# Patient Record
Sex: FEMALE | Race: WHITE | NOT HISPANIC OR LATINO | URBAN - METROPOLITAN AREA
[De-identification: names, ages, dates, MRNs, and addresses within clinical notes are randomized per-mention and may not be internally consistent; named-entity substitution may affect disease eponyms.]

---

## 2018-04-19 ENCOUNTER — EMERGENCY (EMERGENCY)
Facility: HOSPITAL | Age: 23
LOS: 0 days | Discharge: HOME | End: 2018-04-19
Attending: EMERGENCY MEDICINE | Admitting: EMERGENCY MEDICINE
Payer: COMMERCIAL

## 2018-04-19 VITALS
OXYGEN SATURATION: 100 % | HEART RATE: 77 BPM | SYSTOLIC BLOOD PRESSURE: 97 MMHG | TEMPERATURE: 98 F | DIASTOLIC BLOOD PRESSURE: 54 MMHG | RESPIRATION RATE: 16 BRPM

## 2018-04-19 VITALS — WEIGHT: 115.08 LBS | HEIGHT: 64 IN

## 2018-04-19 DIAGNOSIS — R07.9 CHEST PAIN, UNSPECIFIED: ICD-10-CM

## 2018-04-19 DIAGNOSIS — R06.02 SHORTNESS OF BREATH: ICD-10-CM

## 2018-04-19 DIAGNOSIS — K83.0 CHOLANGITIS: ICD-10-CM

## 2018-04-19 LAB
ALBUMIN SERPL ELPH-MCNC: 3.7 G/DL — SIGNIFICANT CHANGE UP (ref 3.3–5)
ALP SERPL-CCNC: 584 U/L — HIGH (ref 40–120)
ALT FLD-CCNC: 194 U/L — HIGH (ref 12–78)
ANION GAP SERPL CALC-SCNC: 7 MMOL/L — SIGNIFICANT CHANGE UP (ref 5–17)
APTT BLD: 30.7 SEC — SIGNIFICANT CHANGE UP (ref 27.5–37.4)
AST SERPL-CCNC: 164 U/L — HIGH (ref 15–37)
BASOPHILS # BLD AUTO: 0.04 K/UL — SIGNIFICANT CHANGE UP (ref 0–0.2)
BASOPHILS NFR BLD AUTO: 0.7 % — SIGNIFICANT CHANGE UP (ref 0–2)
BILIRUB SERPL-MCNC: 0.7 MG/DL — SIGNIFICANT CHANGE UP (ref 0.2–1.2)
BUN SERPL-MCNC: 9 MG/DL — SIGNIFICANT CHANGE UP (ref 7–23)
CALCIUM SERPL-MCNC: 9.2 MG/DL — SIGNIFICANT CHANGE UP (ref 8.5–10.1)
CHLORIDE SERPL-SCNC: 106 MMOL/L — SIGNIFICANT CHANGE UP (ref 96–108)
CK SERPL-CCNC: 99 U/L — SIGNIFICANT CHANGE UP (ref 26–192)
CO2 SERPL-SCNC: 27 MMOL/L — SIGNIFICANT CHANGE UP (ref 22–31)
CREAT SERPL-MCNC: 0.67 MG/DL — SIGNIFICANT CHANGE UP (ref 0.5–1.3)
D DIMER BLD IA.RAPID-MCNC: <150 NG/ML DDU — SIGNIFICANT CHANGE UP
EOSINOPHIL # BLD AUTO: 0.37 K/UL — SIGNIFICANT CHANGE UP (ref 0–0.5)
EOSINOPHIL NFR BLD AUTO: 6.3 % — HIGH (ref 0–6)
GLUCOSE SERPL-MCNC: 91 MG/DL — SIGNIFICANT CHANGE UP (ref 70–99)
HCG SERPL-ACNC: <1 MIU/ML — SIGNIFICANT CHANGE UP
HCT VFR BLD CALC: 36.4 % — SIGNIFICANT CHANGE UP (ref 34.5–45)
HGB BLD-MCNC: 12.9 G/DL — SIGNIFICANT CHANGE UP (ref 11.5–15.5)
IMM GRANULOCYTES NFR BLD AUTO: 0 % — SIGNIFICANT CHANGE UP (ref 0–1.5)
INR BLD: 1.05 RATIO — SIGNIFICANT CHANGE UP (ref 0.88–1.16)
LYMPHOCYTES # BLD AUTO: 2.21 K/UL — SIGNIFICANT CHANGE UP (ref 1–3.3)
LYMPHOCYTES # BLD AUTO: 37.5 % — SIGNIFICANT CHANGE UP (ref 13–44)
MAGNESIUM SERPL-MCNC: 2.1 MG/DL — SIGNIFICANT CHANGE UP (ref 1.6–2.6)
MCHC RBC-ENTMCNC: 31.7 PG — SIGNIFICANT CHANGE UP (ref 27–34)
MCHC RBC-ENTMCNC: 35.4 GM/DL — SIGNIFICANT CHANGE UP (ref 32–36)
MCV RBC AUTO: 89.4 FL — SIGNIFICANT CHANGE UP (ref 80–100)
MONOCYTES # BLD AUTO: 0.61 K/UL — SIGNIFICANT CHANGE UP (ref 0–0.9)
MONOCYTES NFR BLD AUTO: 10.4 % — SIGNIFICANT CHANGE UP (ref 2–14)
NEUTROPHILS # BLD AUTO: 2.66 K/UL — SIGNIFICANT CHANGE UP (ref 1.8–7.4)
NEUTROPHILS NFR BLD AUTO: 45.1 % — SIGNIFICANT CHANGE UP (ref 43–77)
NRBC # BLD: 0 /100 WBCS — SIGNIFICANT CHANGE UP (ref 0–0)
PLATELET # BLD AUTO: 276 K/UL — SIGNIFICANT CHANGE UP (ref 150–400)
POTASSIUM SERPL-MCNC: 3.7 MMOL/L — SIGNIFICANT CHANGE UP (ref 3.5–5.3)
POTASSIUM SERPL-SCNC: 3.7 MMOL/L — SIGNIFICANT CHANGE UP (ref 3.5–5.3)
PROT SERPL-MCNC: 8 GM/DL — SIGNIFICANT CHANGE UP (ref 6–8.3)
PROTHROM AB SERPL-ACNC: 11.3 SEC — SIGNIFICANT CHANGE UP (ref 9.8–12.7)
RBC # BLD: 4.07 M/UL — SIGNIFICANT CHANGE UP (ref 3.8–5.2)
RBC # FLD: 11.7 % — SIGNIFICANT CHANGE UP (ref 10.3–14.5)
SODIUM SERPL-SCNC: 140 MMOL/L — SIGNIFICANT CHANGE UP (ref 135–145)
TROPONIN I SERPL-MCNC: <0.015 NG/ML — SIGNIFICANT CHANGE UP (ref 0.01–0.04)
WBC # BLD: 5.89 K/UL — SIGNIFICANT CHANGE UP (ref 3.8–10.5)
WBC # FLD AUTO: 5.89 K/UL — SIGNIFICANT CHANGE UP (ref 3.8–10.5)

## 2018-04-19 PROCEDURE — 99284 EMERGENCY DEPT VISIT MOD MDM: CPT

## 2018-04-19 PROCEDURE — 93010 ELECTROCARDIOGRAM REPORT: CPT

## 2018-04-19 RX ORDER — SODIUM CHLORIDE 9 MG/ML
1000 INJECTION INTRAMUSCULAR; INTRAVENOUS; SUBCUTANEOUS ONCE
Qty: 0 | Refills: 0 | Status: COMPLETED | OUTPATIENT
Start: 2018-04-19 | End: 2018-04-19

## 2018-04-19 RX ORDER — SODIUM CHLORIDE 9 MG/ML
3 INJECTION INTRAMUSCULAR; INTRAVENOUS; SUBCUTANEOUS ONCE
Qty: 0 | Refills: 0 | Status: COMPLETED | OUTPATIENT
Start: 2018-04-19 | End: 2018-04-19

## 2018-04-19 RX ADMIN — SODIUM CHLORIDE 1000 MILLILITER(S): 9 INJECTION INTRAMUSCULAR; INTRAVENOUS; SUBCUTANEOUS at 21:32

## 2018-04-19 RX ADMIN — SODIUM CHLORIDE 3 MILLILITER(S): 9 INJECTION INTRAMUSCULAR; INTRAVENOUS; SUBCUTANEOUS at 21:32

## 2018-04-19 NOTE — ED STATDOCS - ATTENDING CONTRIBUTION TO CARE
Attending Contribution to Care: I, Emily Rodríguez, performed the initial face to face bedside interview with this patient regarding history of present illness, review of symptoms and relevant past medical, social and family history.  I completed an independent physical examination.  I was the initial provider who evaluated this patient. I have signed out the follow up of any pending tests (i.e. labs, radiological studies) to the ACP.  I have communicated the patient’s plan of care and disposition with the ACP.

## 2018-04-19 NOTE — ED STATDOCS - PROGRESS NOTE DETAILS
DAPHNEY Duran:  Pt seen and examined by the attending, she went to urgent care for eval of intermittent CP and SOB and was referred to ED. CXR done at urgent care and had prelim reading of [facility]. Pt refusing repeating CXR. She brought a CD but unable to open the doc. Pt denies any other complaint. on reviewing the blood work pt f/w elevated LFTs, d/w pt states she does have a hx of primary sclerosing cholangitis and f/u with GI and the numbers in the ED are better than the levels she had outpt. will dc home for outpt f/u. d/w attending.  An opportunity to ask questions was given.  Discussed the importance of prompt, close medical follow-up.  Patient will return with any changes, concerns or persistent / worsening symptoms.  Understanding of all instructions verbalized.

## 2018-04-19 NOTE — ED ADULT NURSE NOTE - OBJECTIVE STATEMENT
Substernal chest pain that started yesterday while at rest patient denies heavy lifting or known injury.  Patient states pain makes her feel short of breath. Hx of flight from [place] on Monday.

## 2018-04-19 NOTE — ED STATDOCS - OBJECTIVE STATEMENT
21 y/o f presenting to the ED sent by [facility] for intermittent CP, SOB since yesterday. Pt states sx began while sitting on the couch. States CP is heavy, usually occurs while sitting at rest. Pt on no-meat diet. Pt received CXR, EKG at [facility] with no remarkable findings. Not on birth control, doesn't think she is pregnant. Denies cough. Denies PMHx. Recent travel to [place]. 21 y/o f presenting to the ED sent by Urgent Care for intermittent CP, SOB since yesterday. Pt states sx began while sitting on the couch. States CP is heavy, usually occurs while sitting at rest. Pt on no-meat diet. Pt received CXR, EKG at [facility] with no remarkable findings. Not on birth control, doesn't think she is pregnant. Denies cough. Denies PMHx. Recent travel to [place].

## 2018-04-19 NOTE — ED STATDOCS - MEDICAL DECISION MAKING DETAILS
EKG, review CXR from [facility], 1 set of screening cardiac labs. If tests are normal, will refer pt top PMD and start pt on lung incentive spirometer.

## 2018-04-19 NOTE — ED STATDOCS - MUSCULOSKELETAL, MLM
range of motion is not limited and there is no muscle tenderness. No reproducible chest wall TTP or back TTP. No calf TTP.

## 2018-09-28 ENCOUNTER — EMERGENCY (EMERGENCY)
Facility: HOSPITAL | Age: 23
LOS: 0 days | Discharge: HOME | End: 2018-09-28
Attending: EMERGENCY MEDICINE | Admitting: EMERGENCY MEDICINE
Payer: COMMERCIAL

## 2018-09-28 VITALS
OXYGEN SATURATION: 99 % | DIASTOLIC BLOOD PRESSURE: 76 MMHG | TEMPERATURE: 99 F | SYSTOLIC BLOOD PRESSURE: 92 MMHG | RESPIRATION RATE: 18 BRPM | HEART RATE: 75 BPM

## 2018-09-28 VITALS — HEIGHT: 64 IN | WEIGHT: 115.08 LBS

## 2018-09-28 DIAGNOSIS — M25.561 PAIN IN RIGHT KNEE: ICD-10-CM

## 2018-09-28 DIAGNOSIS — Z87.19 PERSONAL HISTORY OF OTHER DISEASES OF THE DIGESTIVE SYSTEM: ICD-10-CM

## 2018-09-28 DIAGNOSIS — Z87.828 PERSONAL HISTORY OF OTHER (HEALED) PHYSICAL INJURY AND TRAUMA: ICD-10-CM

## 2018-09-28 DIAGNOSIS — Z98.890 OTHER SPECIFIED POSTPROCEDURAL STATES: ICD-10-CM

## 2018-09-28 PROCEDURE — 93971 EXTREMITY STUDY: CPT | Mod: 26,RT

## 2018-09-28 PROCEDURE — 99284 EMERGENCY DEPT VISIT MOD MDM: CPT

## 2018-09-28 NOTE — ED STATDOCS - MEDICAL DECISION MAKING DETAILS
21 y/o F with right knee pain. Plan: US, reassess. 21 y/o F with right knee pain. Neg ant drawer test, FROm, motor/sensation intact. joint is not lax. Plan: US, reassess.

## 2018-09-28 NOTE — ED STATDOCS - PROGRESS NOTE DETAILS
signed Ashley Gabriel PA-C Pt seen in intake initially by Dr Adan. signed Ashley Gabriel PA-C Pt seen in intake initially by Dr Adan.  22F c/o pain in her right knee, felt a 'pop' last night. able to ambulate with some pain, extensor mechanism intact. No significant findings on sono. Pt declines xray, will f/u with her ortho who did her ACL repair years ago. return precautions given. Pt feeling well, pt and family agree with DC and plan of care. signed Ashley Gabriel PA-C Pt seen in intake initially by Dr Adan.  22F c/o pain in her right knee, felt a 'pop' last night. able to ambulate with some pain, extensor mechanism intact. No significant findings on sono. Pt declines xray, will f/u with her ortho who did her ACL repair years ago. return precautions given. Will apply ace wrap, immobilizer. Pt notes it was hard for her to drive due to the pain. Advised pt not to drive if she is unable to easily operate pedals normally.  Pt feeling well, pt and family agree with DC and plan of care.

## 2018-09-28 NOTE — ED STATDOCS - OBJECTIVE STATEMENT
21 y/o F with PMHx of PSC presenting to the ED c/o right knee pain since last night. Reports that she was sitting down, moved her right leg, and felt a "pop" in her right knee. Pt had right ACL surgery 11 years ago and came to ED because she is concerned about an ACL reinjury. C/o right knee pain. No CP, NKDA. 23 y/o F with PMHx of PSC presenting to the ED c/o right knee pain since last night. Reports that she was sitting down, moved her right leg, and felt a "pop" in her right knee. Pt had right ACL surgery 11 years ago and came to ED because she is concerned about an ACL reinjury. C/o right knee pain. No weakness or numbness. No LE swelling. No direct trauma. Able to walk with a brace on knee.  No CP or SOB, NKDA.

## 2018-09-28 NOTE — ED ADULT TRIAGE NOTE - CHIEF COMPLAINT QUOTE
Patient comes to ED for right knee pain. pt reports sitting on cough, moved leg and felt knee pop. pt has ACl surgery to right leg 10 years ago

## 2018-09-28 NOTE — ED STATDOCS - MUSCULOSKELETAL, MLM
Fullness and tenderness to posterior right knee. FROM of right knee. No effusion. No pain with stressing of knee. Negative anterior drawer test. Spine appears normal.

## 2018-12-30 ENCOUNTER — EMERGENCY (EMERGENCY)
Facility: HOSPITAL | Age: 23
LOS: 0 days | Discharge: HOME | End: 2018-12-30
Attending: EMERGENCY MEDICINE | Admitting: EMERGENCY MEDICINE
Payer: COMMERCIAL

## 2018-12-30 VITALS
HEIGHT: 64 IN | SYSTOLIC BLOOD PRESSURE: 95 MMHG | WEIGHT: 102.96 LBS | DIASTOLIC BLOOD PRESSURE: 68 MMHG | OXYGEN SATURATION: 100 % | TEMPERATURE: 98 F | HEART RATE: 87 BPM | RESPIRATION RATE: 18 BRPM

## 2018-12-30 DIAGNOSIS — R07.82 INTERCOSTAL PAIN: ICD-10-CM

## 2018-12-30 DIAGNOSIS — R05 COUGH: ICD-10-CM

## 2018-12-30 DIAGNOSIS — K83.09 OTHER CHOLANGITIS: ICD-10-CM

## 2018-12-30 DIAGNOSIS — Z79.899 OTHER LONG TERM (CURRENT) DRUG THERAPY: ICD-10-CM

## 2018-12-30 PROBLEM — K83.0 CHOLANGITIS: Chronic | Status: ACTIVE | Noted: 2018-10-03

## 2018-12-30 PROCEDURE — 71046 X-RAY EXAM CHEST 2 VIEWS: CPT | Mod: 26

## 2018-12-30 PROCEDURE — 99283 EMERGENCY DEPT VISIT LOW MDM: CPT | Mod: 25

## 2018-12-30 RX ORDER — CYCLOBENZAPRINE HYDROCHLORIDE 10 MG/1
10 TABLET, FILM COATED ORAL ONCE
Qty: 0 | Refills: 0 | Status: COMPLETED | OUTPATIENT
Start: 2018-12-30 | End: 2018-12-30

## 2018-12-30 RX ORDER — CYCLOBENZAPRINE HYDROCHLORIDE 10 MG/1
1 TABLET, FILM COATED ORAL
Qty: 15 | Refills: 0 | OUTPATIENT
Start: 2018-12-30 | End: 2019-01-03

## 2018-12-30 RX ORDER — LIDOCAINE 4 G/100G
1 CREAM TOPICAL
Qty: 10 | Refills: 0 | OUTPATIENT
Start: 2018-12-30 | End: 2019-01-08

## 2018-12-30 RX ORDER — LIDOCAINE 4 G/100G
1 CREAM TOPICAL ONCE
Qty: 0 | Refills: 0 | Status: COMPLETED | OUTPATIENT
Start: 2018-12-30 | End: 2018-12-30

## 2018-12-30 RX ADMIN — Medication 100 MILLIGRAM(S): at 04:36

## 2018-12-30 RX ADMIN — LIDOCAINE 1 PATCH: 4 CREAM TOPICAL at 04:35

## 2018-12-30 RX ADMIN — CYCLOBENZAPRINE HYDROCHLORIDE 10 MILLIGRAM(S): 10 TABLET, FILM COATED ORAL at 04:36

## 2018-12-30 NOTE — ED PROVIDER NOTE - NSFOLLOWUPINSTRUCTIONS_ED_ALL_ED_FT
please follow up with your doctor in 2-3 days.   take medications as prescribed.   drink plenty of fluids.    may use ice or heating pads for comfort.    return to ED for any concerns

## 2018-12-30 NOTE — ED ADULT NURSE NOTE - OBJECTIVE STATEMENT
patient placed in room at this time. patient c/o right side rib pain from coughing x 1 week.  patient was given Lidoderm patient by her MD which provided relief, but is no long using the patch. pt denies trauma to ribs, no fevers.  pt is jaundice, history of liver disease, patients coloring is her baseline.

## 2018-12-30 NOTE — ED PROVIDER NOTE - PROGRESS NOTE DETAILS
pt and mother told of results.    pt states feels better and requesting scripts for flexeril, cough med and lidoderm patches.   states will f/u

## 2018-12-30 NOTE — ED ADULT NURSE NOTE - NSIMPLEMENTINTERV_GEN_ALL_ED
Implemented All Universal Safety Interventions:  [place] to call system. Call bell, personal items and telephone within reach. Instruct patient to call for assistance. Room bathroom lighting operational. Non-slip footwear when patient is off stretcher. Physically safe environment: no spills, clutter or unnecessary equipment. Stretcher in lowest position, wheels locked, appropriate side rails in place.

## 2018-12-30 NOTE — ED PROVIDER NOTE - OBJECTIVE STATEMENT
24 y/o female in ED with h/o liver disease c/o left lower rib pain tonight after coughing.   pt states has been coughing x 1 wk and given lidoderm patch by PMD with relief.   pt states felt better and stopped using the lidoderm patches.   states tonight with coughing fit and pain returned.   states no meds taken tonight for pain.   pt denies any fever, HA, cp, sob, n/v/d/abd pain.    tolerating PO

## 2019-02-10 PROCEDURE — 76705 ECHO EXAM OF ABDOMEN: CPT | Mod: 26

## 2019-02-10 PROCEDURE — 99284 EMERGENCY DEPT VISIT MOD MDM: CPT | Mod: 25

## 2019-02-10 PROCEDURE — 76815 OB US LIMITED FETUS(S): CPT | Mod: 26

## 2019-04-26 NOTE — ED STATDOCS - NS ED MD DISPO DISCHARGE CCDA
Assessment and Plan


A: POD#1 s/p Repeat c/s


    Bottle feeding


    Pain well controlled


    Stable


    


P: Continue routine PO/PP orders


    Encouraged ambulation in room


    Abdominal binder


    Discharge home in 24-48 hrs





Subjective





- Subjective


Date of service: 19


Principal diagnosis: POD#1 s/p repeat c/s


Interval history: 





See H&P and operative note


Patient reports: appetite normal, voiding normally, pain well controlled, 

flatus, ambulating normally, no bowel movement


: doing well, bottle feeding





Objective





- Vital Signs


Latest vital signs: 


                                   Vital Signs











  Temp Pulse Resp BP BP Pulse Ox


 


 19 08:44    18   


 


 19 08:12  98.3 F  79  18   109/54 


 


 19 04:22   83     95


 


 19 04:20  98.4 F   18   108/56 


 


 19 00:40    18   


 


 19 23:41  98.5 F  84  18  99/53   95


 


 19 20:13  98.2 F  90  18  123/69   96


 


 19 19:23    16   


 


 19 14:50  98.0 F  72  16   115/72  96


 


 19 14:45    16   


 


 19 14:35   73  16   126/71 


 


 19 14:20   70    125/83 


 


 19 14:05   75    119/72 


 


 19 14:00   75    118/72 


 


 19 13:58    16   


 


 19 13:55   72    119/71 


 


 19 13:50  97.7 F  73  16   123/71 








                                Intake and Output











 19





 23:59 07:59 15:59


 


Intake Total 320  120


 


Output Total 670 450 


 


Balance -350 -450 120


 


Intake:   


 


  Oral 320  120


 


Output:   


 


  Urine 670 450 


 


    Indwelling Catheter 320 100 


 


    Uretheral ([place]) 350 200 


 


    Void  150 


 


Other:   


 


  Total, Intake Amount 200  120


 


  Total, Output Amount 200 150 


 


  # Voids   


 


    Void  1 














- Exam


Breasts: Present: normal


Cardiovascular: Present: Regular rate, Normal S1, Normal S2, No murmurs


Lungs: Present: Clear to auscultation, Normal air movement


Abdomen: Present: normal appearance, soft, tenderness (as expected post-op), 

normal bowel sounds.  Absent: distention


Vulva: both: normal


Uterus: Present: firm, fundal height at umbilicus


Extremities: Present: normal


Deep Tendon Reflex Grade: Normal +2


Incision: Present: normal (LTI, closed with SQ sutures and staples, CDI, no 

drainage), dry, intact





- Labs


Labs: 


                              Abnormal lab results











  19 Range/Units





  02:38 


 


Hgb  7.7 L  (10.1-14.3)  gm/dl


 


Hct  24.0 L  (30.3-42.9)  % Patient/Caregiver provided printed discharge information.

## 2019-06-12 ENCOUNTER — EMERGENCY (EMERGENCY)
Facility: HOSPITAL | Age: 24
LOS: 0 days | Discharge: HOME | End: 2019-06-12
Attending: EMERGENCY MEDICINE | Admitting: EMERGENCY MEDICINE
Payer: COMMERCIAL

## 2019-06-12 VITALS
SYSTOLIC BLOOD PRESSURE: 107 MMHG | RESPIRATION RATE: 17 BRPM | HEART RATE: 67 BPM | DIASTOLIC BLOOD PRESSURE: 61 MMHG | TEMPERATURE: 98 F | OXYGEN SATURATION: 100 %

## 2019-06-12 VITALS — WEIGHT: 110.01 LBS | HEIGHT: 64 IN

## 2019-06-12 DIAGNOSIS — S23.9XXA SPRAIN OF UNSPECIFIED PARTS OF THORAX, INITIAL ENCOUNTER: ICD-10-CM
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
## 2019-06-12 DIAGNOSIS — F41.9 ANXIETY DISORDER, UNSPECIFIED: ICD-10-CM

## 2019-06-12 DIAGNOSIS — M54.9 DORSALGIA, UNSPECIFIED: ICD-10-CM

## 2019-06-12 DIAGNOSIS — Z91.040 LATEX ALLERGY STATUS: ICD-10-CM

## 2019-06-12 DIAGNOSIS — M54.6 PAIN IN THORACIC SPINE: ICD-10-CM
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
## 2019-06-12 LAB
APPEARANCE UR: ABNORMAL
BACTERIA # UR AUTO: ABNORMAL
BILIRUB UR-MCNC: NEGATIVE — SIGNIFICANT CHANGE UP
COLOR SPEC: YELLOW — SIGNIFICANT CHANGE UP
COMMENT - URINE: SIGNIFICANT CHANGE UP
DIFF PNL FLD: ABNORMAL
EPI CELLS # UR: ABNORMAL
GLUCOSE UR QL: NEGATIVE MG/DL — SIGNIFICANT CHANGE UP
KETONES UR-MCNC: ABNORMAL
LEUKOCYTE ESTERASE UR-ACNC: ABNORMAL
NITRITE UR-MCNC: NEGATIVE — SIGNIFICANT CHANGE UP
PH UR: 6 — SIGNIFICANT CHANGE UP (ref 5–8)
PROT UR-MCNC: 15 MG/DL
RBC CASTS # UR COMP ASSIST: ABNORMAL /HPF (ref 0–4)
SP GR SPEC: 1.01 — SIGNIFICANT CHANGE UP (ref 1.01–1.02)
UROBILINOGEN FLD QL: 1 MG/DL
WBC UR QL: SIGNIFICANT CHANGE UP

## 2019-06-12 PROCEDURE — 93010 ELECTROCARDIOGRAM REPORT: CPT

## 2019-06-12 PROCEDURE — 71046 X-RAY EXAM CHEST 2 VIEWS: CPT | Mod: 26

## 2019-06-12 PROCEDURE — 99285 EMERGENCY DEPT VISIT HI MDM: CPT

## 2019-06-12 RX ORDER — LIDOCAINE 4 G/100G
1 CREAM TOPICAL
Qty: 6 | Refills: 0
Start: 2019-06-12

## 2019-06-12 RX ORDER — DIAZEPAM 5 MG
5 TABLET ORAL ONCE
Refills: 0 | Status: DISCONTINUED | OUTPATIENT
Start: 2019-06-12 | End: 2019-06-12

## 2019-06-12 RX ORDER — CYCLOBENZAPRINE HYDROCHLORIDE 10 MG/1
1 TABLET, FILM COATED ORAL
Qty: 20 | Refills: 0
Start: 2019-06-12

## 2019-06-12 RX ORDER — IBUPROFEN 200 MG
600 TABLET ORAL ONCE
Refills: 0 | Status: COMPLETED | OUTPATIENT
Start: 2019-06-12 | End: 2019-06-12

## 2019-06-12 RX ORDER — CYCLOBENZAPRINE HYDROCHLORIDE 10 MG/1
10 TABLET, FILM COATED ORAL ONCE
Refills: 0 | Status: COMPLETED | OUTPATIENT
Start: 2019-06-12 | End: 2019-06-12

## 2019-06-12 RX ORDER — LIDOCAINE 4 G/100G
1 CREAM TOPICAL ONCE
Refills: 0 | Status: COMPLETED | OUTPATIENT
Start: 2019-06-12 | End: 2019-06-12

## 2019-06-12 RX ADMIN — CYCLOBENZAPRINE HYDROCHLORIDE 10 MILLIGRAM(S): 10 TABLET, FILM COATED ORAL at 14:38

## 2019-06-12 RX ADMIN — Medication 600 MILLIGRAM(S): at 14:00

## 2019-06-12 RX ADMIN — LIDOCAINE 1 PATCH: 4 CREAM TOPICAL at 15:41

## 2019-06-12 RX ADMIN — Medication 5 MILLIGRAM(S): at 13:07

## 2019-06-12 RX ADMIN — Medication 600 MILLIGRAM(S): at 13:05

## 2019-06-12 NOTE — ED STATDOCS - CLINICAL SUMMARY MEDICAL DECISION MAKING FREE TEXT BOX
Plan for CXR and pain control. Plan for CXR and pain control. CXR unremarkable. Small amount of blood in urine, pt made aware of results. Advised PCP follow up. Will d/c home with flexeril and lidoderm patch.

## 2019-06-12 NOTE — ED STATDOCS - OBJECTIVE STATEMENT
22 y/o female with a PMHx of PSC presents to the ED c/o back pain since yesterday. Pt states she has left sided back pain. Reports when taking a deep breath her pain is worse. States because she can not take a deep breath she is having some anxiety. Took CBD last night. Denies SOB, abd pain.

## 2019-06-12 NOTE — ED STATDOCS - PROGRESS NOTE DETAILS
24 y/o F with 22 y/o F with PMH of primary sclerosing cholangitis presents with left sided thoracic back pain since yesterday. States pain is worse with deep inspiration. Reports associated anxiety as well. Attempted to manage pain with CBD last night with no change. Denies CP, SOB, abdominal pain, palpitations. PE: Well appearing. Cardiac: s1/s2, RRR. Lungs: CTAB. Abdomen: NBS x4, soft, nontender. MSK: No obvious deformity to spine. No midline T or L-spine tenderness. +Left sided T-spine paraspinal tenderness. A/P: reproducible left sided thoracic spine pain. Plan for analgesia, CXR, UA. Reassess. - Wang Patel PA-C Pt reports no change in pain, will provide flexeril. - Wang Patel PA-C Pt reports improvement in pain following flexeril. Requesting lidoderm patch for use following discharge. Will d/c home with lidoderm and flexeril. Recommended PCP follow up. - Wang Patel PA-C

## 2019-06-12 NOTE — ED ADULT TRIAGE NOTE - CHIEF COMPLAINT QUOTE
c/o constant back spasm started yesterday causing difficulty breathing, no acute respiratory distress noted, O2 sat in triage 99% on RA, pulse 103, denies acute injury or previous back pain history, pt states she took CBD and magnesium with no relief in symptoms

## 2019-06-12 NOTE — ED STATDOCS - ATTENDING CONTRIBUTION TO CARE
I, Frances Gross MD,  performed the initial face to face bedside interview with this patient regarding history of present illness, review of symptoms and relevant past medical, social and family history.  I completed an independent physical examination.  I was the initial provider who evaluated this patient. I have signed out the follow up of any pending tests (i.e. labs, radiological studies) to the ACP.  I have communicated the patient’s plan of care and disposition with the ACP.  The history, relevant review of systems, past medical and surgical history, medical decision making, and physical examination was documented by the scribe in my presence and I attest to the accuracy of the documentation.

## 2019-06-12 NOTE — ED STATDOCS - CARE PLAN
Principal Discharge DX:	Thoracic sprain Principal Discharge DX:	Thoracic sprain  Secondary Diagnosis:	Acute midline thoracic back pain